# Patient Record
Sex: FEMALE
[De-identification: names, ages, dates, MRNs, and addresses within clinical notes are randomized per-mention and may not be internally consistent; named-entity substitution may affect disease eponyms.]

---

## 2024-08-02 PROBLEM — Z00.00 ENCOUNTER FOR PREVENTIVE HEALTH EXAMINATION: Status: ACTIVE | Noted: 2024-08-02

## 2024-08-13 ENCOUNTER — APPOINTMENT (OUTPATIENT)
Dept: ORTHOPEDIC SURGERY | Facility: CLINIC | Age: 74
End: 2024-08-13
Payer: MEDICARE

## 2024-08-13 DIAGNOSIS — M89.9 DISORDER OF BONE, UNSPECIFIED: ICD-10-CM

## 2024-08-13 PROCEDURE — 99204 OFFICE O/P NEW MOD 45 MIN: CPT

## 2024-08-13 NOTE — DISCUSSION/SUMMARY
[All Questions Answered] : Patient (and family) had all questions answered to an agreeable level of satisfaction [Interested in Proceeding] : Patient (and family) expressed understanding and interest in proceeding with the plan as outlined [de-identified] : Low-grade cartilage lesion in the right distal femur. It is of moderate size but also up against the posterior cortex. It does not look like it is affecting the cortex but I would like to keep an eye on this. Follow up in 3 months for radiographic and clinical surveillance. She is free for all activity in the meantime.  If imaging or pathology/biopsy was ordered, the patient was told to make an appointment to review findings right after all imaging is completed.   We discussed risks, benefits and alternatives. Rationale of care was reviewed and all questions were answered.

## 2024-08-13 NOTE — DATA REVIEWED
[Imaging Present] : Present [de-identified] : X-rays taken 5/28/2024, multiple views of the right knee, show a 2 cm spiculated mass in the posterior portion of the distal femur centrally coming up against the central cortex. No significant lucency or extraosseous component.  MRI right knee noncontrast 6/17/2024 IMPRESSION: 1. Chondroid lesion centered at the posterior lateral paracentral distal femoral metaphysis extending into the epiphysis with cortical thinning and endosteal scalloping at the posterior supracondylar femur and roof of the intercondylar notch. Imaging features are indicative of a low-grade chondroid lesion. Follow-up MRI at six-month intervals for a period of 2 years is advised to ensure stability. 2. Tricompartmental cartilage thinning most severe at the medial compartment where there is mild articular impaction with subarticular marrow edema. 3. Degenerative medial meniscal tear. 4. Effusion and popliteal cyst. --- By my read, there is no surrounding edema or cortical involvement or any extraosseous edema.

## 2024-08-13 NOTE — DISCUSSION/SUMMARY
[All Questions Answered] : Patient (and family) had all questions answered to an agreeable level of satisfaction [Interested in Proceeding] : Patient (and family) expressed understanding and interest in proceeding with the plan as outlined [de-identified] : Low-grade cartilage lesion in the right distal femur. It is of moderate size but also up against the posterior cortex. It does not look like it is affecting the cortex but I would like to keep an eye on this. Follow up in 3 months for radiographic and clinical surveillance. She is free for all activity in the meantime.  If imaging or pathology/biopsy was ordered, the patient was told to make an appointment to review findings right after all imaging is completed.   We discussed risks, benefits and alternatives. Rationale of care was reviewed and all questions were answered.

## 2024-08-13 NOTE — PHYSICAL EXAM
[General Appearance - Well-Appearing] : Well appearing [General Appearance - Well Nourished] : well nourished [Oriented To Time, Place, And Person] : Oriented to person, place, and time [Sclera] : the sclera and conjunctiva were normal [Neck Cervical Mass (___cm)] : no neck mass was observed [Heart Rate And Rhythm] : heart rate was normal and rhythm regular [] : No respiratory distress [Abdomen Soft] : Soft [FreeTextEntry1] : On exam the patient stands in good balance. She walks with a slight waddling gait but no antalgia. On the right side she has some minimal anteromedial joint line tenderness to palpation, no pain elsewhere. She has 0-120 degrees ROM with stability. She has some spider veins but no other skin lesions.

## 2024-08-13 NOTE — ADDENDUM
[FreeTextEntry1] : I, Jose David Gunter, documented this note as a scribe on behalf of Dr. Keenan Shin on 08/13/2024.

## 2024-08-13 NOTE — DATA REVIEWED
[Imaging Present] : Present [de-identified] : X-rays taken 5/28/2024, multiple views of the right knee, show a 2 cm spiculated mass in the posterior portion of the distal femur centrally coming up against the central cortex. No significant lucency or extraosseous component.  MRI right knee noncontrast 6/17/2024 IMPRESSION: 1. Chondroid lesion centered at the posterior lateral paracentral distal femoral metaphysis extending into the epiphysis with cortical thinning and endosteal scalloping at the posterior supracondylar femur and roof of the intercondylar notch. Imaging features are indicative of a low-grade chondroid lesion. Follow-up MRI at six-month intervals for a period of 2 years is advised to ensure stability. 2. Tricompartmental cartilage thinning most severe at the medial compartment where there is mild articular impaction with subarticular marrow edema. 3. Degenerative medial meniscal tear. 4. Effusion and popliteal cyst. --- By my read, there is no surrounding edema or cortical involvement or any extraosseous edema.

## 2024-08-13 NOTE — END OF VISIT
[FreeTextEntry3] : All medical record entries made by the Scribe were at my, Dr. Keenan Shin, direction and personally dictated by me on 08/13/2024. I have reviewed the chart and agree that the record accurately reflects my personal performance of the history, physical exam, assessment and plan. I have also personally directed, reviewed, and agreed with the chart.

## 2024-08-13 NOTE — HISTORY OF PRESENT ILLNESS
[2] : currently ~his/her~ pain is 2 out of 10 [Joint Movement] : worsened by joint movement [FreeTextEntry1] : This is a 74 year old female who presented to her primary doctor with complaints of knee pain, was sent for imaging and then sent to another orthopedist and was then sent here for a lesion seen in the right distal femur. Today, she reiterates her bilateral knee pain but notes no specific pain in the posterior knee at the area of concern overlying the lesion.

## 2024-11-12 ENCOUNTER — APPOINTMENT (OUTPATIENT)
Dept: ORTHOPEDIC SURGERY | Facility: CLINIC | Age: 74
End: 2024-11-12
Payer: MEDICARE

## 2024-11-12 PROCEDURE — 99213 OFFICE O/P EST LOW 20 MIN: CPT

## 2024-11-12 PROCEDURE — 73562 X-RAY EXAM OF KNEE 3: CPT | Mod: RT
